# Patient Record
Sex: FEMALE | URBAN - METROPOLITAN AREA
[De-identification: names, ages, dates, MRNs, and addresses within clinical notes are randomized per-mention and may not be internally consistent; named-entity substitution may affect disease eponyms.]

---

## 2019-02-12 ENCOUNTER — DOCUMENTATION ONLY (OUTPATIENT)
Dept: FAMILY MEDICINE CLINIC | Age: 49
End: 2019-02-12

## 2019-02-12 NOTE — PROGRESS NOTES
Patient's friend called back and was upset that patient was not going to be seen. I explained to the patient's friend about what I discussed with the patient. I also informed her that we do take passport but it hs to be up to date and not . I also added that we will try to accommodate patient once she has an updated ID.

## 2019-02-12 NOTE — PROGRESS NOTES
Patient was forwarded to me by [facility] and was very upset. Patient explained that she is in pain that she needs to see the doctor and she was rescheduled too far out. Patient added that she has money that she can pay for her visits. I apologized to the patient and informed her that it was not about the money at all that we could still see her even if she doesn't have any money. I then explained to her that her  license  around 2018 that we would need a valid ID. I advised the patient to renew her ID and get at least a letter from [facility] in order to be seen. Patient then stated that it is not going to help her to be seen soon. I then assured her that once she gets a valid ID that I will try to squeeze her in. Patient then calmed down and stated that she will take care of her ID.

## 2019-03-06 ENCOUNTER — DOCUMENTATION ONLY (OUTPATIENT)
Dept: FAMILY MEDICINE CLINIC | Age: 49
End: 2019-03-06

## 2019-03-06 NOTE — PROGRESS NOTES
Patient did not arrive to their scheduled new patient appointment on 3/5/19. Discharge letter sent on 03/06/19. Thank you.